# Patient Record
(demographics unavailable — no encounter records)

---

## 2025-05-27 NOTE — HISTORY OF PRESENT ILLNESS
[de-identified] : This 40-year-old  has a history of reasonably constant ongoing lower back pain since 2014.  The pain is not radiated to her legs.  She has not had lower extremity neurologic symptoms of numbness, paresthesias or weakness.  There is no Valsalva effect.  She has not had night pain.  The pain can be aggravated by prolonged sitting, standing or walking.  Treatment to date has been rest, heat and Tylenol.  She has also had some right sided periscapular and upper back pain and left shoulder pain.  Her past medical history is positive for allergic reactions with some swelling of her eyes with ibuprofen, Aleve and Celebrex.  She is a half a pack a day smoker.  Her review of systems is otherwise negative. [Pain Location] : pain [Worsening] : worsening [0] : a minimum pain level of 0/10 [7] : a maximum pain level of 7/10 [Intermit.] : ~He/She~ states the symptoms seem to be intermittent

## 2025-05-27 NOTE — DISCUSSION/SUMMARY
[de-identified] : She has some daily habits that can aggravate and propagate neck and upper back pain and we discussed the necessity to discontinue those activities.  She will continue to rest and use moist heat.  I discussed that normally these symptoms would be treated with nonsteroidal anti-inflammatory medications but she has had some allergic reactions to ibuprofen, Aleve and Celebrex.  I do not think she has Samter syndrome as there is no history of asthma or nasal polyps.  She has been placed on a 10-day prednisone taper and I will see her for follow-up in 3 weeks.  If the symptoms improve she will be started on a program of regular lower back exercises.  If the shoulder is no better she will see one of our shoulder specialist.

## 2025-05-27 NOTE — REVIEW OF SYSTEMS
[Negative] : Genitourinary [FreeTextEntry3] : Swelling in her eyes with nonsteroidal anti-inflammatory medicines [FreeTextEntry4] : No history of nasal polyps [FreeTextEntry6] : No history of asthma

## 2025-05-27 NOTE — REASON FOR VISIT
[Initial Visit] : an initial visit for [Back Pain] : back pain [Neck Pain] : neck pain [FreeTextEntry2] : Left shoulder pain

## 2025-05-27 NOTE — PHYSICAL EXAM
[de-identified] : She is fully alert and oriented with a normal mood and affect.  She is in no acute distress as I take the history.  She is overweight.  She ambulates with a normal gait including tiptoe and heel walking.  There are no antalgic or coxalgia components to her gait.  There are no cutaneous abnormalities or palpable bony defects of the spine.  There is no evidence of shortness of breath or respiratory distress.  There is no paravertebral muscle spasm but there was some mild discomfort with sidebending.  There is no sciatic or trochanteric tenderness.  Forward flexion of the spine at 60 degrees cause some lower back discomfort.  A lower extremity neurological examination revealed 1-2+ symmetrical reflexes.  Motor power is normal to manual testing in all lower extremity groups and sensation is normal to light touch in all dermatomes.  Straight leg raising is negative to 90 degrees in the sitting position bilaterally.  Her hips and her knees have a full and painless range of motion normal stability.  Vascular examination shows no evidence of varicosities and there is no lymphedema.  Her upper extremity neurological examination also revealed 1-2+ symmetrical reflexes.  Motor power is normal to manual testing in all upper extremity groups and sensation is normal to light touch in all dermatomes.  There is some discomfort with range of motion of the left shoulder and some mild discomfort over the bicipital groove and the insertion of the rotator cuff.  Range of motion of the cervical spine showed flexion with the chin reaching to within 2 fingerbreadths of the chest, extension of 80 degrees with rotation of 80 degrees bilaterally and tilt of 30 degrees bilaterally with some discomfort at the extremes. [de-identified] : AP and lateral x-rays of the lumbar spine revealed normal sagittal alignment.  Disc heights are well-maintained.  There are no destructive changes.  AP and lateral x-rays of the cervical spine revealed some mild disc space narrowing at C4-5 and significant disc space narrowing and osteophyte formation at C5-6.  There is a reversal of the normal cervical lordosis.  There are no destructive changes.  2 views of the left shoulder show no bony abnormalities.  There are no destructive changes.